# Patient Record
Sex: MALE | Race: WHITE | Employment: FULL TIME | URBAN - NONMETROPOLITAN AREA
[De-identification: names, ages, dates, MRNs, and addresses within clinical notes are randomized per-mention and may not be internally consistent; named-entity substitution may affect disease eponyms.]

---

## 2017-05-19 ENCOUNTER — PATIENT MESSAGE (OUTPATIENT)
Dept: INTERNAL MEDICINE | Age: 57
End: 2017-05-19

## 2017-06-06 ENCOUNTER — OFFICE VISIT (OUTPATIENT)
Dept: ORTHOPEDIC SURGERY | Age: 57
End: 2017-06-06
Payer: COMMERCIAL

## 2017-06-06 VITALS
HEART RATE: 74 BPM | BODY MASS INDEX: 38.08 KG/M2 | SYSTOLIC BLOOD PRESSURE: 129 MMHG | WEIGHT: 266 LBS | HEIGHT: 70 IN | DIASTOLIC BLOOD PRESSURE: 76 MMHG

## 2017-06-06 DIAGNOSIS — M70.31 BURSITIS OF RIGHT ELBOW: Primary | ICD-10-CM

## 2017-06-06 PROCEDURE — 99213 OFFICE O/P EST LOW 20 MIN: CPT | Performed by: NURSE PRACTITIONER

## 2017-06-06 RX ORDER — METHYLPREDNISOLONE 4 MG/1
TABLET ORAL
Qty: 1 KIT | Refills: 0 | Status: SHIPPED | OUTPATIENT
Start: 2017-06-06 | End: 2017-06-12

## 2017-10-26 ENCOUNTER — TELEPHONE (OUTPATIENT)
Dept: SURGERY | Age: 57
End: 2017-10-26

## 2017-10-31 ENCOUNTER — TELEPHONE (OUTPATIENT)
Dept: SURGERY | Age: 57
End: 2017-10-31

## 2017-10-31 NOTE — TELEPHONE ENCOUNTER
10/31/2017 Mailed Dr Maxim Valdez colonoscopy paperwork to patient for 1st colonoscopy , no sxs or problems.   Ref Julius Madison MD from [facility]They will fax H&P and med list

## 2018-04-10 ENCOUNTER — HOSPITAL ENCOUNTER (OUTPATIENT)
Dept: MRI IMAGING | Age: 58
Discharge: HOME | End: 2018-04-12
Payer: COMMERCIAL

## 2018-04-10 DIAGNOSIS — H93.12 TINNITUS, LEFT EAR: ICD-10-CM

## 2018-04-10 PROCEDURE — 70553 MRI BRAIN STEM W/O & W/DYE: CPT

## 2018-04-10 PROCEDURE — 6360000004 HC RX CONTRAST MEDICATION: Performed by: FAMILY MEDICINE

## 2018-04-10 PROCEDURE — A9579 GAD-BASE MR CONTRAST NOS,1ML: HCPCS | Performed by: FAMILY MEDICINE

## 2018-04-10 RX ADMIN — GADOTERIDOL 20 ML: 279.3 INJECTION, SOLUTION INTRAVENOUS at 08:58

## 2018-04-18 ENCOUNTER — HOSPITAL ENCOUNTER (OUTPATIENT)
Dept: MRI IMAGING | Age: 58
Discharge: HOME | End: 2018-04-20
Payer: COMMERCIAL

## 2018-04-18 DIAGNOSIS — R93.0 ABNORMAL FINDINGS ON DX IMAGING OF SKULL AND HEAD, NEC: ICD-10-CM

## 2018-04-18 PROCEDURE — 70553 MRI BRAIN STEM W/O & W/DYE: CPT

## 2018-04-18 PROCEDURE — 6360000004 HC RX CONTRAST MEDICATION: Performed by: FAMILY MEDICINE

## 2018-04-18 PROCEDURE — A9579 GAD-BASE MR CONTRAST NOS,1ML: HCPCS | Performed by: FAMILY MEDICINE

## 2018-04-18 RX ADMIN — GADOTERIDOL 20 ML: 279.3 INJECTION, SOLUTION INTRAVENOUS at 11:09

## 2018-06-05 ENCOUNTER — HOSPITAL ENCOUNTER (OUTPATIENT)
Dept: LAB | Age: 58
Setting detail: SPECIMEN
Discharge: HOME | End: 2018-06-05
Payer: COMMERCIAL

## 2018-06-05 LAB
CORTISOL COLLECTION INFO: ABNORMAL
CORTISOL: 0.5 UG/DL (ref 2.7–18.4)
SEX HORMONE BINDING GLOBULIN: 21 NMOL/L (ref 11–80)
TESTOSTERONE FREE-NONMALE: 48.8 PG/ML (ref 47–244)
TESTOSTERONE TOTAL: 202 NG/DL (ref 220–1000)
TESTOSTERONE, BIOAVAILABLE: 114.3 NG/DL (ref 130–680)

## 2018-06-05 PROCEDURE — 84403 ASSAY OF TOTAL TESTOSTERONE: CPT

## 2018-06-05 PROCEDURE — 84270 ASSAY OF SEX HORMONE GLOBUL: CPT

## 2018-06-05 PROCEDURE — 82533 TOTAL CORTISOL: CPT

## 2018-06-05 PROCEDURE — 36415 COLL VENOUS BLD VENIPUNCTURE: CPT

## 2018-06-05 PROCEDURE — 84305 ASSAY OF SOMATOMEDIN: CPT

## 2018-06-06 LAB
IGF-1 COLLECTION INFO: NORMAL
SOMATOMEDIN C: 72.3 NG/ML (ref 51–187)

## 2018-10-09 ENCOUNTER — HOSPITAL ENCOUNTER (OUTPATIENT)
Dept: GENERAL RADIOLOGY | Age: 58
Discharge: HOME | End: 2018-10-11
Payer: COMMERCIAL

## 2018-10-09 DIAGNOSIS — M25.561 RIGHT KNEE PAIN, UNSPECIFIED CHRONICITY: ICD-10-CM

## 2018-10-09 PROCEDURE — 73562 X-RAY EXAM OF KNEE 3: CPT

## 2018-10-17 ENCOUNTER — PATIENT MESSAGE (OUTPATIENT)
Dept: INTERNAL MEDICINE | Age: 58
End: 2018-10-17

## 2018-10-18 NOTE — TELEPHONE ENCOUNTER
From: [facility]  To: Nadia Landis DO  Sent: 10/17/2018 6:24 PM EDT  Subject: Non-Urgent Medical Question    I have a question about XR KNEE 3 VW RIGHT resulted on 10/9/18, 4:19 PM.    The swelling has not gone down in the course of two weeks. Does the healing process have a much longer time frame because of my age?

## 2018-11-26 ENCOUNTER — HOSPITAL ENCOUNTER (OUTPATIENT)
Dept: MRI IMAGING | Age: 58
Discharge: HOME | End: 2018-11-28
Payer: COMMERCIAL

## 2018-11-26 DIAGNOSIS — E23.7 DISORDER OF PITUITARY GLAND (HCC): ICD-10-CM

## 2018-11-26 PROCEDURE — 70553 MRI BRAIN STEM W/O & W/DYE: CPT

## 2018-11-26 PROCEDURE — 6360000004 HC RX CONTRAST MEDICATION: Performed by: FAMILY MEDICINE

## 2018-11-26 PROCEDURE — A9579 GAD-BASE MR CONTRAST NOS,1ML: HCPCS | Performed by: FAMILY MEDICINE

## 2018-11-26 RX ADMIN — GADOTERIDOL 15 ML: 279.3 INJECTION, SOLUTION INTRAVENOUS at 13:55

## 2019-02-18 ENCOUNTER — HOSPITAL ENCOUNTER (OUTPATIENT)
Dept: GENERAL RADIOLOGY | Age: 59
Discharge: HOME | End: 2019-02-20
Payer: COMMERCIAL

## 2019-02-18 DIAGNOSIS — R10.9 ABDOMINAL PAIN, UNSPECIFIED ABDOMINAL LOCATION: ICD-10-CM

## 2019-02-18 PROCEDURE — 74019 RADEX ABDOMEN 2 VIEWS: CPT

## 2019-07-09 ENCOUNTER — TELEPHONE (OUTPATIENT)
Dept: SURGERY | Age: 59
End: 2019-07-09

## 2019-08-27 ENCOUNTER — TELEPHONE (OUTPATIENT)
Dept: SURGERY | Age: 59
End: 2019-08-27

## 2019-10-14 ENCOUNTER — TELEPHONE (OUTPATIENT)
Dept: SURGERY | Age: 59
End: 2019-10-14

## 2020-02-22 ENCOUNTER — HOSPITAL ENCOUNTER (OUTPATIENT)
Dept: GENERAL RADIOLOGY | Age: 60
Discharge: HOME | End: 2020-02-24
Payer: COMMERCIAL

## 2020-02-22 PROCEDURE — 73130 X-RAY EXAM OF HAND: CPT

## 2021-01-07 ENCOUNTER — OFFICE VISIT (OUTPATIENT)
Dept: OTOLARYNGOLOGY | Age: 61
End: 2021-01-07
Payer: COMMERCIAL

## 2021-01-07 VITALS
HEART RATE: 63 BPM | DIASTOLIC BLOOD PRESSURE: 80 MMHG | OXYGEN SATURATION: 98 % | SYSTOLIC BLOOD PRESSURE: 130 MMHG | TEMPERATURE: 97.8 F | HEIGHT: 70 IN | RESPIRATION RATE: 14 BRPM | WEIGHT: 266 LBS | BODY MASS INDEX: 38.08 KG/M2

## 2021-01-07 DIAGNOSIS — H91.93 BILATERAL HEARING LOSS, UNSPECIFIED HEARING LOSS TYPE: ICD-10-CM

## 2021-01-07 DIAGNOSIS — Z87.820 H/O CONCUSSION: ICD-10-CM

## 2021-01-07 DIAGNOSIS — Z77.122 EXPOSURE TO NOISE: ICD-10-CM

## 2021-01-07 DIAGNOSIS — H93.12 TINNITUS OF LEFT EAR: Primary | ICD-10-CM

## 2021-01-07 PROCEDURE — 99204 OFFICE O/P NEW MOD 45 MIN: CPT | Performed by: OTOLARYNGOLOGY

## 2021-01-07 RX ORDER — LOSARTAN POTASSIUM 100 MG/1
100 TABLET ORAL DAILY
COMMUNITY

## 2021-01-07 NOTE — PROGRESS NOTES
2021 10:29 AM LARA Escamilla (:  1960) is a 56 y.o. male,New patient, here for evaluation of the following chief complaint(s):  Tinnitus (mostly left sided at times hears pulse in left ear)      ASSESSMENT/PLAN:  1. Tinnitus of left ear    2. Exposure to noise    3. Bilateral hearing loss, unspecified hearing loss type    4. H/O concussion      1. Tinnitus of left ear  -     External Referral To Audiology  2. Exposure to noise  3. Bilateral hearing loss, unspecified hearing loss type  4. H/O concussion    Repeat and compare to 2016 - will scan in 2016 audio   May consider repeat MRI IAC imaging versus CT temporal bone    Tinnitus counseling:   Discussed that 25% will resolve and 50% will improve and 25% will stay the same/progress   Important time frame is 6 months as over 6 months is less likely to resolve   Discussed MRI for unilateral tinnitus  Discussed the importance of documenting hearing with an audiogram   Discussed to avoid triggers such as nsaids, caffeine, and tobacco use    No supplements or medications has shown robust efficacy for tinnitus in the research   Discussed that the research does not support use of herbal supplements as part of the formal AAO tinnitus guidelines however some patients have found improvement with slow-release niacin and zinc  Discussed distraction/masking techniques, masking devices, hearing aid, and cognitive behavioral therapy if the tinnitus becomes more bothersome    No follow-ups on file.     SUBJECTIVE/OBJECTIVE:  HPI Patient is a 68-year-old man with a 5-year history of left-sided high-pitched constant tinnitus. There is some fluctuation in intensity. He mostly notices it in quiet environments. He is a  and does not seem to notice it in noisy environments at work. He wears a CPAP machine at nighttime but can hear the noise over the CPAP machine and therefore listens to music to fall asleep. He has annual hearing test done for his work and has been told that he has age-related hearing loss but not to the point where he is required a hearing aid. He has a history of multiple concussions. 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 An MRI brain was done 2 years ago for tinnitus work-up that showed a small pituitary adenoma that is being monitored. He had an audiogram 2 years ago that he believes showed some age-related hearing loss. Patient also has a history of cervical spine stenosis for which he had surgery. Lipid panel 1/27/2015 high   TSH none     Audiogram performed May 4, 2016 at [facility] showed bilateral normal hearing downsloping to moderately severe hearing and then upsloping to normal hearing. Depth of the downslope was 65 dB at 2000 Hz bilaterally. Bone line masking was not performed over these thresholds so unclear if this is pure sensorineural or any conductive component. Consistent with noise-induced hearing loss. Review of Systems  ENT ROS: positive for - tinnitus    General: The patient is found to be alert and normally responsive male with grossly normal hearing, clear voice and normal articulation. Communication is without difficulty. Voice: Clear   Skin: The skin has normal colour and turgor. Face: The facial contour is symmetric at rest and with movement. Ears: The pinnae have normal contours.     AD: EAC clear, TM intact, no effusion/erythema/retraction AS: EAC clear, TM intact, no effusion/erythema/retraction  No objective tinnitus    Eye: The ocular movements are full and symmetric, the conjunctiva is unremarkable; sclera are anicteric, pupillary response is symmetric. No nystagmus is found. An electronic signature was used to authenticate this note.     --Mark Garcia MD     1/7/2021 10:29 AM EST

## 2021-02-25 ENCOUNTER — OFFICE VISIT (OUTPATIENT)
Dept: OTOLARYNGOLOGY | Age: 61
End: 2021-02-25
Payer: COMMERCIAL

## 2021-02-25 VITALS
HEIGHT: 70 IN | WEIGHT: 273.4 LBS | HEART RATE: 65 BPM | OXYGEN SATURATION: 96 % | DIASTOLIC BLOOD PRESSURE: 80 MMHG | BODY MASS INDEX: 39.14 KG/M2 | SYSTOLIC BLOOD PRESSURE: 128 MMHG

## 2021-02-25 DIAGNOSIS — H90.3 SENSORINEURAL HEARING LOSS (SNHL) OF BOTH EARS: Primary | ICD-10-CM

## 2021-02-25 DIAGNOSIS — H93.12 TINNITUS OF LEFT EAR: ICD-10-CM

## 2021-02-25 PROCEDURE — 99213 OFFICE O/P EST LOW 20 MIN: CPT | Performed by: OTOLARYNGOLOGY

## 2021-02-25 NOTE — PROGRESS NOTES
2021 10:29 AM LARA Saenz (:  1960) is a 56 y.o. male,New patient, here for evaluation of the following chief complaint(s):  Tinnitus (Left ear) and Results (Audiogram results done in [place])      ASSESSMENT/PLAN:  1. Sensorineural hearing loss (SNHL) of both ears    2. Tinnitus of left ear      1. Sensorineural hearing loss (SNHL) of both ears  2. Tinnitus of left ear    Discussed the option of CT temporal bone for unilateral tinnitus. Will defer for now  Follow-up in 1 year to repeat audiogram and to evaluate tinnitus  Protection in noise  He is not interested in any hearing aids or masking devices at this time. Tinnitus counseling:   Discussed that 25% will resolve and 50% will improve and 25% will stay the same/progress   Important time frame is 6 months as over 6 months is less likely to resolve   Discussed MRI for unilateral tinnitus  Discussed the importance of documenting hearing with an audiogram   Discussed to avoid triggers such as nsaids, caffeine, and tobacco use    No supplements or medications has shown robust efficacy for tinnitus in the research   Discussed that the research does not support use of herbal supplements as part of the formal AAO tinnitus guidelines however some patients have found improvement with slow-release niacin and zinc  Discussed distraction/masking techniques, masking devices, hearing aid, and cognitive behavioral therapy if the tinnitus becomes more bothersome    No follow-ups on file.     SUBJECTIVE/OBJECTIVE:  HPI Patient is a 75-year-old man with a 5-year history of left-sided high-pitched constant tinnitus. There is some fluctuation in intensity. He mostly notices it in quiet environments. He is a  and does not seem to notice it in noisy environments at work. He wears a CPAP machine at nighttime but can hear the noise over the CPAP machine and therefore listens to music to fall asleep. He has annual hearing test done for his work and has been told that he has age-related hearing loss but not to the point where he is required a hearing aid. He has a history of multiple concussions. 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 An MRI brain was done 2 years ago for tinnitus work-up that showed a small pituitary adenoma that is being monitored. He had an audiogram 2 years ago that he believes showed some age-related hearing loss. Patient also has a history of cervical spine stenosis for which he had surgery. Lipid panel 1/27/2015 high   TSH none     Audiogram performed May 4, 2016 at [facility] showed bilateral normal hearing downsloping to moderately severe hearing and then upsloping to normal hearing. Depth of the downslope was 65 dB at 2000 Hz bilaterally. Bone line masking was not performed over these thresholds so unclear if this is pure sensorineural or any conductive component. Consistent with noise-induced hearing loss. Today follows up for audiogram results.   Audiogram 2/15/2021  Normal hearing through 1000 Hz sloping to a moderately severe sensorineural hearing loss at 2000 Hz recovering to normal hearing at 8000 Hz AU  AU type A tympanometry  Word discrimination score 88% on the right, 92% on the left    Review of Systems  ENT ROS: positive for - tinnitus General: The patient is found to be alert and normally responsive male with grossly normal hearing, clear voice and normal articulation. Communication is without difficulty. Voice: Clear   Skin: The skin has normal colour and turgor. Face: The facial contour is symmetric at rest and with movement. Ears: The pinnae have normal contours. AD: EAC clear, TM intact, no effusion/erythema/retraction   AS: EAC clear, TM intact, no effusion/erythema/retraction  No objective tinnitus    Eye: The ocular movements are full and symmetric, the conjunctiva is unremarkable; sclera are anicteric, pupillary response is symmetric. No nystagmus is found. An electronic signature was used to authenticate this note.     --Mark Garcia MD     2/25/2021 10:29 AM EST

## 2021-05-19 ENCOUNTER — HOSPITAL ENCOUNTER (OUTPATIENT)
Dept: CT IMAGING | Age: 61
Discharge: HOME | End: 2021-05-21
Payer: COMMERCIAL

## 2021-05-19 DIAGNOSIS — R10.9 ABDOMINAL PAIN, UNSPECIFIED ABDOMINAL LOCATION: ICD-10-CM

## 2021-05-19 PROCEDURE — 74176 CT ABD & PELVIS W/O CONTRAST: CPT

## 2024-12-27 ENCOUNTER — HOSPITAL ENCOUNTER (OUTPATIENT)
Dept: CT IMAGING | Age: 64
Discharge: HOME | End: 2024-12-29
Payer: COMMERCIAL

## 2024-12-27 ENCOUNTER — HOSPITAL ENCOUNTER (OUTPATIENT)
Age: 64
Discharge: HOME | End: 2024-12-27
Payer: COMMERCIAL

## 2024-12-27 ENCOUNTER — OFFICE VISIT (OUTPATIENT)
Dept: PRIMARY CARE CLINIC | Age: 64
End: 2024-12-27
Payer: COMMERCIAL

## 2024-12-27 VITALS
WEIGHT: 249 LBS | SYSTOLIC BLOOD PRESSURE: 120 MMHG | BODY MASS INDEX: 35.73 KG/M2 | TEMPERATURE: 98.4 F | DIASTOLIC BLOOD PRESSURE: 78 MMHG | OXYGEN SATURATION: 98 % | HEART RATE: 64 BPM

## 2024-12-27 DIAGNOSIS — R10.84 GENERALIZED ABDOMINAL PAIN: ICD-10-CM

## 2024-12-27 DIAGNOSIS — R10.84 GENERALIZED ABDOMINAL PAIN: Primary | ICD-10-CM

## 2024-12-27 LAB
ALBUMIN SERPL-MCNC: 4 G/DL (ref 3.5–5.2)
ALBUMIN/GLOB SERPL: 1.3 {RATIO} (ref 1–2.5)
ALP SERPL-CCNC: 76 U/L (ref 40–129)
ALT SERPL-CCNC: 32 U/L (ref 5–41)
ANION GAP SERPL CALCULATED.3IONS-SCNC: 9 MMOL/L (ref 9–17)
AST SERPL-CCNC: 29 U/L
BASOPHILS # BLD: 0.05 K/UL (ref 0–0.2)
BASOPHILS NFR BLD: 1 % (ref 0–2)
BILIRUB SERPL-MCNC: 0.5 MG/DL (ref 0.3–1.2)
BUN SERPL-MCNC: 19 MG/DL (ref 8–23)
BUN/CREAT SERPL: 19 (ref 9–20)
CALCIUM SERPL-MCNC: 8.8 MG/DL (ref 8.6–10.4)
CHLORIDE SERPL-SCNC: 103 MMOL/L (ref 98–107)
CO2 SERPL-SCNC: 27 MMOL/L (ref 20–31)
CREAT SERPL-MCNC: 1 MG/DL (ref 0.7–1.2)
EOSINOPHIL # BLD: 0.34 K/UL (ref 0–0.44)
EOSINOPHILS RELATIVE PERCENT: 5 % (ref 1–4)
ERYTHROCYTE [DISTWIDTH] IN BLOOD BY AUTOMATED COUNT: 13.4 % (ref 11.8–14.4)
GFR, ESTIMATED: 83 ML/MIN/1.73M2
GLUCOSE SERPL-MCNC: 107 MG/DL (ref 70–99)
HCT VFR BLD AUTO: 40.4 % (ref 40.7–50.3)
HGB BLD-MCNC: 13.4 G/DL (ref 13–17)
IMM GRANULOCYTES # BLD AUTO: 0.05 K/UL (ref 0–0.3)
IMM GRANULOCYTES NFR BLD: 1 %
LYMPHOCYTES NFR BLD: 2.52 K/UL (ref 1.1–3.7)
LYMPHOCYTES RELATIVE PERCENT: 34 % (ref 24–43)
MCH RBC QN AUTO: 29.2 PG (ref 25.2–33.5)
MCHC RBC AUTO-ENTMCNC: 33.2 G/DL (ref 25.2–33.5)
MCV RBC AUTO: 88 FL (ref 82.6–102.9)
MONOCYTES NFR BLD: 0.81 K/UL (ref 0.1–1.2)
MONOCYTES NFR BLD: 11 % (ref 3–12)
NEUTROPHILS NFR BLD: 48 % (ref 36–65)
NEUTS SEG NFR BLD: 3.59 K/UL (ref 1.5–8.1)
NRBC BLD-RTO: 0 PER 100 WBC
PLATELET # BLD AUTO: 250 K/UL (ref 138–453)
PMV BLD AUTO: 9 FL (ref 8.1–13.5)
POTASSIUM SERPL-SCNC: 4.6 MMOL/L (ref 3.7–5.3)
PROT SERPL-MCNC: 7.1 G/DL (ref 6.4–8.3)
RBC # BLD AUTO: 4.59 M/UL (ref 4.21–5.77)
SODIUM SERPL-SCNC: 139 MMOL/L (ref 135–144)
WBC OTHER # BLD: 7.4 K/UL (ref 3.5–11.3)

## 2024-12-27 PROCEDURE — 85025 COMPLETE CBC W/AUTO DIFF WBC: CPT

## 2024-12-27 PROCEDURE — 74177 CT ABD & PELVIS W/CONTRAST: CPT

## 2024-12-27 PROCEDURE — 6360000004 HC RX CONTRAST MEDICATION: Performed by: FAMILY MEDICINE

## 2024-12-27 PROCEDURE — 99203 OFFICE O/P NEW LOW 30 MIN: CPT | Performed by: FAMILY MEDICINE

## 2024-12-27 PROCEDURE — 1123F ACP DISCUSS/DSCN MKR DOCD: CPT | Performed by: FAMILY MEDICINE

## 2024-12-27 PROCEDURE — 3078F DIAST BP <80 MM HG: CPT | Performed by: FAMILY MEDICINE

## 2024-12-27 PROCEDURE — 36415 COLL VENOUS BLD VENIPUNCTURE: CPT

## 2024-12-27 PROCEDURE — 3074F SYST BP LT 130 MM HG: CPT | Performed by: FAMILY MEDICINE

## 2024-12-27 PROCEDURE — 80053 COMPREHEN METABOLIC PANEL: CPT

## 2024-12-27 RX ORDER — ATORVASTATIN CALCIUM 20 MG/1
TABLET, FILM COATED ORAL
COMMUNITY

## 2024-12-27 RX ORDER — FLUTICASONE PROPIONATE 50 MCG
SPRAY, SUSPENSION (ML) NASAL
COMMUNITY
Start: 2024-05-10

## 2024-12-27 RX ORDER — IOPAMIDOL 755 MG/ML
100 INJECTION, SOLUTION INTRAVASCULAR
Status: COMPLETED | OUTPATIENT
Start: 2024-12-27 | End: 2024-12-27

## 2024-12-27 RX ORDER — METFORMIN HYDROCHLORIDE 500 MG/1
TABLET, EXTENDED RELEASE ORAL
COMMUNITY

## 2024-12-27 RX ADMIN — IOPAMIDOL 100 ML: 755 INJECTION, SOLUTION INTRAVENOUS at 12:20

## 2024-12-27 ASSESSMENT — ENCOUNTER SYMPTOMS
COUGH: 0
WHEEZING: 0
SHORTNESS OF BREATH: 0
ABDOMINAL DISTENTION: 1
ABDOMINAL PAIN: 1
CHEST TIGHTNESS: 0
BACK PAIN: 1
DIARRHEA: 1
VOMITING: 1
CONSTIPATION: 0

## 2024-12-27 NOTE — PROGRESS NOTES
[facility]  [address]  [facility] [id number]  Dept: [phone]  Dept Fax: [phone]    Theron Contreras  is a 64 y.o. male who presents today for his medical conditions/complaints as noted below.  Theron Contreras is c/o of   Chief Complaint   Patient presents with    Abdominal Pain     Started over the weekend and had some vomiting. Getting gassy and some abdominal pain the right side.       HPI:     History of Present Illness  The patient is a 64-year-old male who presents today for stomach pain.    He reports experiencing right-sided abdominal pain, which he attributes to his bladder exerting pressure on an organ. He also notes the onset of upper back pain, a new symptom for him. He has been experiencing diarrhea for the past week, with no solid stools. Despite feeling weak, he attempted to eat on Wednesday but was unable to defecate yesterday. He reports no difficulty with urination or presence of blood in urine. He experienced a low-grade fever on Monday, which rendered him bedridden for most of the day. He felt well enough to engage in activities yesterday but experienced discomfort after eating skyline chili and soup. He did not vomit but reports a sensation of bloating and swelling in his abdomen. He has been passing gas, which was followed by an episode of diarrhea on Tuesday. He has not experienced any testicular swelling. He has not taken his medications today due to his current symptoms. He has been experiencing headaches, which he attributes to poor air quality. He has been exposed to sick children in his capacity as a . He has a history of diverticulitis and has been advised to avoid nuts. He recently consumed street corn and experienced regurgitation of undigested lettuce. He has no history of kidney stones.    SOCIAL HISTORY  He works as a school